# Patient Record
(demographics unavailable — no encounter records)

---

## 2024-12-18 NOTE — HISTORY OF PRESENT ILLNESS
[FreeTextEntry1] : 51 year old male with pmhx of HTN, DMT2, HLD, CAD w/ stent placement  12.2024 Here for /fu, generally feels well and endorses no acute complaints. No interval events since LV. Today adherence to basal bolus regimen as instructed. tolerated and taking synjardy as prescribed on LV, no LUTS. he wants to cont ozempic 0.5 mg due to worsening appetite control. endorses some bloating but is moving his stools w/o issues.  FSG remain uncontrolled, AM readings ~160. post prandial readings ~180. no hypoglycemia. he admits to not taking lantus ~ 2 times weekly, bolus insulin is missed "more often". he otherwise denies any f/c, CP, SOB, palpitations, tremors, depressed mood, anxiety, palpitations, n/v, stool/urinary abn, skin/weight changes, heat/cold intolerance, HAs, breast/nipple changes, polyuria/polydipsia/nocturia or other complaints.   initial evaluation and management of diabetes reports recent hospitalization for CVA in 10/2022: sudden onset of slurred speech and right  leg weakness, NIHSS 5 on arrival. NCHCT demonstrated chronic L cerebellar  infarct, CTA head and neck with moderate atherosclerotic stenosis of the  bilateral ICA's, notably also present in the proximal basilar artery. There is  also occlusion of the left vertebral artery at the origin with reconstitution  at the distal V2 segment from deep cervical arteries. MRI with subacute to  chronic left paracentral pontine infarction several small chronic left  cerebellar infarctions. ILR placed. Was seen by structural heart for PFO, rec  outpatient follow up. Hypercoagulable labs drawn, anticardiolipin antibody  positive. Found to be perfusion dependent so midodrine started with good  response. Patient to be on aspirin and plavix for 3 months before transitioning  to aspirin monotherapy (325mg).  Was discharged on basal bolus therapy, which he has remained adherent to. Monitoring FSG TIDAC and bedtime. FSG in AM ~100 w/ Glargine 44 units QHS, post prandial euglycemia as well w/ Admelog 16 units TIDAC.

## 2024-12-18 NOTE — ASSESSMENT
[Long Term Vascular Complications] : long term vascular complications of diabetes [Carbohydrate Consistent Diet] : carbohydrate consistent diet [Importance of Diet and Exercise] : importance of diet and exercise to improve glycemic control, achieve weight loss and improve cardiovascular health [Hypoglycemia Management] : hypoglycemia management [Action and use of Insulin] : action and use of short and long-acting insulin [Self Monitoring of Blood Glucose] : self monitoring of blood glucose [Injection Technique, Storage, Sharps Disposal] : injection technique, storage, and sharps disposal [FreeTextEntry1] : 1) DM2: Uncontrolled, A1C target of <7%. Natural hx of the disease and importance of treatment targets discussed at length, he verbalized understanding. ADA diet and importance of exercise discussed at length. Plan is to increase metformin to full dose. Reduce Lantus to 40 units QHS, adjust bolus insulin to 12-16 units TIDAC. explained the potential high risk and toxicities with chronic insulin use including, but not limited to life threatening hypoglycemia and death, stop incretin therapy w/ ozempic per pt's wish,. start SGLT-2 inh therapy, combine w/ metformin as synjardy 5/1000 mg BID, r/b/a and s/e including LUTS/UTI reviewed. Verbalized understanding and agrees with treatment plan, will contact MD and seek emergency medical care if condition changes. titration instructions provided. Refer to Nutritionist today. We jeff check microalbumin, lipids and labs on the NV. Discuss vaccines and podiatry/opthalmology referrals on NV  2) Weight gain: complicated by DM2. Discussed medical strategies. Pt would like to try lifestyle modifications and GLP-1 agonist therapy. Reassess on the NV for at least ~5% TBW loss.  3) Essential HTN: Pt is at goal BP and on an ACE inh. Reassess microalbumin prior to the NV.  4) Dyslipidemia: Pt is on a high intensity statin. Atorvastatin 80 mg QDaily. REassess lipids on the next visit. LDL target <100.   5) ED no CP, no CVAs since 2022, r/b/a reviewed, start therapeutic trial of PDI inh therapy, advised on precautions to avoid w. nitrates. Verbalized understanding and agrees with treatment plan, will contact MD and seek emergency medical care if condition changes.

## 2025-04-07 NOTE — DISCUSSION/SUMMARY
[FreeTextEntry1] : 51 year-old gentleman with a past medical history significant for HTN, DMT2, HLD, CAD w/ stent placement (2015 - on ASA), Stroke (10/2022), and PFO s/p closure by Dr. Quiñonez.  He has an ILR that was implanted on 10/26/22 post stroke.   1.  CVA s/p ILR No arrhythmias events since device placement The device is functioning appropriately as programmed and all measured data is WNL.  The patient is enrolled in remote monitoring and was asked to return for follow-up in one year.

## 2025-04-07 NOTE — HISTORY OF PRESENT ILLNESS
[TextEntry] : Mr. Powers is a pleasant 51 year-old gentleman with a past medical history significant for HTN, DMT2, HLD, CAD w/ stent placement (2015 - on ASA), Stroke (10/2022), and PFO s/p closure by Dr. Quiñonez.  He has an ILR that was implanted on 10/26/22 post stroke.   He denies any awareness of rapid/irregular heart action.  He denies any stroke like symptoms.    No device related complaints.

## 2025-04-07 NOTE — ADDENDUM
[FreeTextEntry1] : I, Yvette Montiel, am scribing for and the presence of Dr. Cabrera the following sections: HPI, PMH,Family/social history, ROS, Physical Exam, Assessment / Plan.   I, Jonathon Cabrera, personally performed the services described in the documentation, reviewed the documentation recorded by the scribe in my presence and it accurately and completely records my words and actions.

## 2025-04-07 NOTE — PHYSICAL EXAM
[Bleeding] : no active bleeding [Foul Odor] : no foul smell [Purulent Drainage] : no purulent drainage [Serosanguineous Drainage] : no serosanquineous drainage [Serous Drainage] : serous drainage [Erythema] : not erythematous [Warm] : not warm [Tender] : not tender [Fluctuant] : not fluctuant [FreeTextEntry2] : scant serous drainage.  wound edges not yet approximated [FreeTextEntry1] : L 4th ICS lat to sternum

## 2025-04-10 NOTE — ASSESSMENT
[FreeTextEntry1] : 52 YO Male w/ PMHx of HTN, HLD, DM, CAD s/p stent placement, who originally presented to [facility] as a code stroke on 10/18/22 with right sided weakness and slurred speech, found to have several L cerebellar infarcts, L paracentral pontine infarction. DISHA and TTE were significant for a PFO. Pt was evaluated by EP and is s/p ILR which showed no afib x 3 months.  The patient underwent a PFO closure on 3/16/2023 and presents for follow up.   The patient is clinically stable, ECHO was reviewed and independently interpreted by Dr. Ythan Goldberg which showed patent foramen ovale closure. Atrial septal occluder appears well seated without evidence of a shunt by colorflow Doppler, the results were discussed with the patient. All questions answered.

## 2025-04-10 NOTE — HISTORY OF PRESENT ILLNESS
[FreeTextEntry1] : 50 YO Male w/ PMHx of HTN, HLD, DM, CAD s/p stent placement, who originally presented to [facility] as a code stroke on 10/18/22 with right sided weakness and slurred speech, found to have several L cerebellar infarcts, L paracentral pontine infarction. DISHA and TTE were significant for a PFO. Pt was evaluated by EP and is s/p ILR which showed no afib x 3 months.  The patient underwent a PFO closure on 3/16/2023 and presents for follow up.   Since his last visit, the patient he is feeling well with no complaints. He denies any SOB at rest or with exertion, chest pain, palpitations, or any new neurological deficits.   The patient had an ECHO on 4/4/2025 which showed 1. Left ventricular systolic function is normal with an ejection fraction of 63 % by Marshall's method of disks. There are no regional wall motion abnormalities seen. 2. Mild left ventricular hypertrophy. 3. Normal left ventricular diastolic function.4. Normal right ventricular systolic function.5. S/P patent foramen ovale closure. Atrial septal occluder appears well seated without evidence of a shunt by color flow Doppler.6. No significant valvular disease.7. Tricuspid aortic valve with normal leaflet excursion.8. Pulmonary artery systolic pressure could not be estimated.9. No pericardial effusion seen.10. No prior echocardiogram is available for comparison.

## 2025-04-10 NOTE — HISTORY OF PRESENT ILLNESS
[FreeTextEntry1] : 50 YO Male w/ PMHx of HTN, HLD, DM, CAD s/p stent placement, who originally presented to [facility] as a code stroke on 10/18/22 with right sided weakness and slurred speech, found to have several L cerebellar infarcts, L paracentral pontine infarction. DISHA and TTE were significant for a PFO. Pt was evaluated by EP and is s/p ILR which showed no afib x 3 months.  The patient underwent a PFO closure on 3/16/2023 and presents for follow up.   Since his last visit, the patient he is feeling well with no complaints. He denies any SOB at rest or with exertion, chest pain, palpitations, or any new neurological deficits.   The patient had an ECHO on 4/4/2025 which showed 1. Left ventricular systolic function is normal with an ejection fraction of 63 % by Marshall's method of disks. There are no regional wall motion abnormalities seen. 2. Mild left ventricular hypertrophy. 3. Normal left ventricular diastolic function.4. Normal right ventricular systolic function.5. S/P patent foramen ovale closure. Atrial septal occluder appears well seated without evidence of a shunt by color flow Doppler.6. No significant valvular disease.7. Tricuspid aortic valve with normal leaflet excursion.8. Pulmonary artery systolic pressure could not be estimated.9. No pericardial effusion seen.10. No prior echocardiogram is available for comparison.

## 2025-04-10 NOTE — ASSESSMENT
[FreeTextEntry1] : 52 YO Male w/ PMHx of HTN, HLD, DM, CAD s/p stent placement, who originally presented to [facility] as a code stroke on 10/18/22 with right sided weakness and slurred speech, found to have several L cerebellar infarcts, L paracentral pontine infarction. DISHA and TTE were significant for a PFO. Pt was evaluated by EP and is s/p ILR which showed no afib x 3 months.  The patient underwent a PFO closure on 3/16/2023 and presents for follow up.   The patient is clinically stable, ECHO was reviewed and independently interpreted by Dr. Ythan Goldberg which showed patent foramen ovale closure. Atrial septal occluder appears well seated without evidence of a shunt by colorflow Doppler, the results were discussed with the patient. All questions answered.

## 2025-04-10 NOTE — ASSESSMENT
[FreeTextEntry1] : 52 YO Male w/ PMHx of HTN, HLD, DM, CAD s/p stent placement, who originally presented to [facility] as a code stroke on 10/18/22 with right sided weakness and slurred speech, found to have several L cerebellar infarcts, L paracentral pontine infarction. DISHA and TTE were significant for a PFO. Pt was evaluated by EP and is s/p ILR which showed no afib x 3 months.  The patient underwent a PFO closure on 3/16/2023 and presents for follow up.   The patient is clinically stable, ECHO was reviewed and independently interpreted by Dr. Ythan Goldberg which showed patent foramen ovale closure. Atrial septal occluder appears well seated without evidence of a shunt by colorflow Doppler, the results were discussed with the patient. All questions answered.

## 2025-04-10 NOTE — HISTORY OF PRESENT ILLNESS
[FreeTextEntry1] : 52 YO Male w/ PMHx of HTN, HLD, DM, CAD s/p stent placement, who originally presented to [facility] as a code stroke on 10/18/22 with right sided weakness and slurred speech, found to have several L cerebellar infarcts, L paracentral pontine infarction. DISHA and TTE were significant for a PFO. Pt was evaluated by EP and is s/p ILR which showed no afib x 3 months.  The patient underwent a PFO closure on 3/16/2023 and presents for follow up.   Since his last visit, the patient he is feeling well with no complaints. He denies any SOB at rest or with exertion, chest pain, palpitations, or any new neurological deficits.   The patient had an ECHO on 4/4/2025 which showed 1. Left ventricular systolic function is normal with an ejection fraction of 63 % by Marshall's method of disks. There are no regional wall motion abnormalities seen. 2. Mild left ventricular hypertrophy. 3. Normal left ventricular diastolic function.4. Normal right ventricular systolic function.5. S/P patent foramen ovale closure. Atrial septal occluder appears well seated without evidence of a shunt by color flow Doppler.6. No significant valvular disease.7. Tricuspid aortic valve with normal leaflet excursion.8. Pulmonary artery systolic pressure could not be estimated.9. No pericardial effusion seen.10. No prior echocardiogram is available for comparison.

## 2025-04-10 NOTE — PLAN
[TextEntry] : 1) Will return to D clinic in one year with ECHO 2) Antibiotics for dental prophylaxis  3) Follow up with neurology as scheduled   I, Dr. Charli Quiñonez, personally performed the evaluation and management (E/M) services for this established patient who presents today with (a) new problem(s)/exacerbation of (an) existing condition(s). That E/M includes conducting the clinically appropriate interval history &/or exam, assessing all new/exacerbated conditions, and establishing a new plan of care. Today, my SANDY, noted herewith, was here to observe my evaluation and management service for this new problem/exacerbated condition and follow the plan of care established by me going forward.

## 2025-05-02 NOTE — HISTORY OF PRESENT ILLNESS
[FreeTextEntry1] : 51 year old male with pmhx of HTN, DMT2, HLD, CAD w/ stent placement  4/2025 Here for /fu, generally feels well and endorses no acute complaints. No interval events since LV. Today adherence to basal bolus regimen as instructed. tolerated and taking synjardy as prescribed on LV, no LUTS. he wants to cont ozempic 0.5 mg due to worsening appetite control. endorses some bloating but is moving his stools w/o issues.  FSG remain uncontrolled, AM readings ~160. post prandial readings ~180. no hypoglycemia. he admits to not taking lantus ~ 2 times weekly, bolus insulin is missed "more often". he otherwise denies any f/c, CP, SOB, palpitations, tremors, depressed mood, anxiety, palpitations, n/v, stool/urinary abn, skin/weight changes, heat/cold intolerance, HAs, breast/nipple changes, polyuria/polydipsia/nocturia or other complaints.   initial evaluation and management of diabetes reports recent hospitalization for CVA in 10/2022: sudden onset of slurred speech and right  leg weakness, NIHSS 5 on arrival. NCHCT demonstrated chronic L cerebellar  infarct, CTA head and neck with moderate atherosclerotic stenosis of the  bilateral ICA's, notably also present in the proximal basilar artery. There is  also occlusion of the left vertebral artery at the origin with reconstitution  at the distal V2 segment from deep cervical arteries. MRI with subacute to  chronic left paracentral pontine infarction several small chronic left  cerebellar infarctions. ILR placed. Was seen by structural heart for PFO, rec  outpatient follow up. Hypercoagulable labs drawn, anticardiolipin antibody  positive. Found to be perfusion dependent so midodrine started with good  response. Patient to be on aspirin and plavix for 3 months before transitioning  to aspirin monotherapy (325mg).  Was discharged on basal bolus therapy, which he has remained adherent to. Monitoring FSG TIDAC and bedtime. FSG in AM ~100 w/ Glargine 44 units QHS, post prandial euglycemia as well w/ Admelog 16 units TIDAC.

## 2025-05-02 NOTE — ASSESSMENT
[Long Term Vascular Complications] : long term vascular complications of diabetes [Carbohydrate Consistent Diet] : carbohydrate consistent diet [Importance of Diet and Exercise] : importance of diet and exercise to improve glycemic control, achieve weight loss and improve cardiovascular health [Hypoglycemia Management] : hypoglycemia management [Action and use of Insulin] : action and use of short and long-acting insulin [Self Monitoring of Blood Glucose] : self monitoring of blood glucose [Injection Technique, Storage, Sharps Disposal] : injection technique, storage, and sharps disposal [FreeTextEntry1] : 1) DM2: Uncontrolled, A1C target of <7%. Natural hx of the disease and importance of treatment targets discussed at length, he verbalized understanding. ADA diet and importance of exercise discussed at length. Plan is to increase metformin to full dose. adjust Lantus to 40 units QHS, adjust bolus insulin to 16-20 units TIDAC. explained the potential high risk and toxicities with chronic insulin use including, but not limited to life threatening hypoglycemia and death, reduce incretin therapy w/ ozempic per pt's wish, 0.5 mg max tolerated dose. start SGLT-2 inh therapy, combine w/ metformin as synjardy 5/1000 mg BID, r/b/a and s/e including LUTS/UTI reviewed. Verbalized understanding and agrees with treatment plan, will contact MD and seek emergency medical care if condition changes. titration instructions provided. Refer to Nutritionist today. We jeff check microalbumin, lipids and labs on the NV. Discuss vaccines and podiatry/opthalmology referrals on NV  2) Weight gain: complicated by DM2. Discussed medical strategies. Pt would like to try lifestyle modifications and GLP-1 agonist therapy. Reassess on the NV for at least ~5% TBW loss.  3) Essential HTN: Pt is at goal BP and on an ACE inh. Reassess microalbumin prior to the NV.  4) Dyslipidemia: Pt is on a high intensity statin. Atorvastatin 80 mg QDaily. REassess lipids on the next visit. LDL target <100.   5) ED no CP, no CVAs since 2022, r/b/a reviewed, start therapeutic trial of PDI inh therapy, advised on precautions to avoid w. nitrates. Verbalized understanding and agrees with treatment plan, will contact MD and seek emergency medical care if condition changes.

## 2025-07-30 NOTE — HISTORY OF PRESENT ILLNESS
[FreeTextEntry1] : 51 year old male with pmhx of HTN, DMT2, HLD, CAD w/ stent placement  7/2025 Here for /fu, generally feels well and endorses no acute complaints. No interval events since LV. Today adherence to basal bolus regimen as instructed. tolerated and taking synjardy as prescribed on LV, no LUTS. he wants to cont ozempic 0.5 mg due to worsening appetite control. endorses some bloating but is moving his stools w/o issues.  FSG remain uncontrolled, AM readings ~160. post prandial readings ~180. no hypoglycemia. he admits to not taking lantus ~ 2 times weekly, bolus insulin is missed "more often". he otherwise denies any f/c, CP, SOB, palpitations, tremors, depressed mood, anxiety, palpitations, n/v, stool/urinary abn, skin/weight changes, heat/cold intolerance, HAs, breast/nipple changes, polyuria/polydipsia/nocturia or other complaints.   initial evaluation and management of diabetes reports recent hospitalization for CVA in 10/2022: sudden onset of slurred speech and right  leg weakness, NIHSS 5 on arrival. NCHCT demonstrated chronic L cerebellar  infarct, CTA head and neck with moderate atherosclerotic stenosis of the  bilateral ICA's, notably also present in the proximal basilar artery. There is  also occlusion of the left vertebral artery at the origin with reconstitution  at the distal V2 segment from deep cervical arteries. MRI with subacute to  chronic left paracentral pontine infarction several small chronic left  cerebellar infarctions. ILR placed. Was seen by structural heart for PFO, rec  outpatient follow up. Hypercoagulable labs drawn, anticardiolipin antibody  positive. Found to be perfusion dependent so midodrine started with good  response. Patient to be on aspirin and plavix for 3 months before transitioning  to aspirin monotherapy (325mg).  Was discharged on basal bolus therapy, which he has remained adherent to. Monitoring FSG TIDAC and bedtime. FSG in AM ~100 w/ Glargine 44 units QHS, post prandial euglycemia as well w/ Admelog 16 units TIDAC.    no vertigo/no headache